# Patient Record
Sex: FEMALE | Race: WHITE | URBAN - METROPOLITAN AREA
[De-identification: names, ages, dates, MRNs, and addresses within clinical notes are randomized per-mention and may not be internally consistent; named-entity substitution may affect disease eponyms.]

---

## 2017-12-24 ENCOUNTER — OFFICE VISIT (OUTPATIENT)
Dept: URGENT CARE | Facility: URGENT CARE | Age: 82
End: 2017-12-24
Payer: COMMERCIAL

## 2017-12-24 VITALS
DIASTOLIC BLOOD PRESSURE: 77 MMHG | WEIGHT: 167.7 LBS | TEMPERATURE: 97.3 F | OXYGEN SATURATION: 96 % | SYSTOLIC BLOOD PRESSURE: 136 MMHG | HEART RATE: 66 BPM

## 2017-12-24 DIAGNOSIS — N39.0 ACUTE UTI: Primary | ICD-10-CM

## 2017-12-24 DIAGNOSIS — R30.0 DYSURIA: ICD-10-CM

## 2017-12-24 DIAGNOSIS — R82.90 NONSPECIFIC FINDING ON EXAMINATION OF URINE: ICD-10-CM

## 2017-12-24 LAB
BACTERIA #/AREA URNS HPF: ABNORMAL /HPF
RBC #/AREA URNS AUTO: ABNORMAL /HPF
WBC #/AREA URNS AUTO: ABNORMAL /HPF

## 2017-12-24 PROCEDURE — 99203 OFFICE O/P NEW LOW 30 MIN: CPT | Performed by: PHYSICIAN ASSISTANT

## 2017-12-24 PROCEDURE — 81015 MICROSCOPIC EXAM OF URINE: CPT | Performed by: PHYSICIAN ASSISTANT

## 2017-12-24 PROCEDURE — 87086 URINE CULTURE/COLONY COUNT: CPT | Performed by: PHYSICIAN ASSISTANT

## 2017-12-24 RX ORDER — CEFDINIR 300 MG/1
300 CAPSULE ORAL 2 TIMES DAILY
Qty: 20 CAPSULE | Refills: 0 | Status: SHIPPED | OUTPATIENT
Start: 2017-12-24

## 2017-12-24 RX ORDER — DILTIAZEM HYDROCHLORIDE 120 MG/1
CAPSULE, EXTENDED RELEASE ORAL DAILY
COMMUNITY

## 2017-12-24 RX ORDER — NITROGLYCERIN 40 MG/1
1 PATCH TRANSDERMAL DAILY
COMMUNITY

## 2017-12-24 NOTE — PROGRESS NOTES
SUBJECTIVE:   Gloria LEONE Haaseth is a 84 year old female who  presents today for a possible UTI. Symptoms of dysuria and frequency have been going on for 2-3 days with low back discomfort.     She was treated with Septra about 10 days ago for a UTI, but her symptoms never completely cleared.    Hematuria no.  gradual onset and moderate.  There is no history of fever, chills, nausea or vomiting.  No history of vaginal discharge. This patient does have a history of urinary tract infections. Patient denies long duration, rigors, flank pain, temperature > 101 degrees F. and Vomiting, significant nausea or diarrhea.     SH: patient is visiting a friend in [facility], here from [place].       No past medical history on file.   UTI's  Atrial fibrillation  HTN    There is no problem list on file for this patient.    Social History   Substance Use Topics     Smoking status: Never Smoker     Smokeless tobacco: Not on file     Alcohol use Not on file       ROS:   CONSTITUTIONAL:NEGATIVE for fever, chills, change in weight  INTEGUMENTARY/SKIN: NEGATIVE for worrisome rashes, moles or lesions  EYES: NEGATIVE for vision changes or irritation  ENT/MOUTH: NEGATIVE for ear, mouth and throat problems  RESP:NEGATIVE for significant cough or SOB  CV: NEGATIVE for chest pain, palpitations or peripheral edema  GI: NEGATIVE for nausea, abdominal pain, heartburn, or change in bowel habits  : as per HPI  MUSCULOSKELETAL: as per HPI  NEURO: NEGATIVE for weakness, dizziness or paresthesias    OBJECTIVE:  /77  Pulse 66  Temp 97.3  F (36.3  C) (Oral)  Wt 167 lb 11.2 oz (76.1 kg)  SpO2 96%  GENERAL APPEARANCE: healthy, alert and no distress  ABDOMEN:  soft, nontender, no HSM or masses and bowel sounds normal  BACK: No CVA tenderness  SKIN: no suspicious lesions or rashes    ASSESSMENT:   Lower, uncomplicated urinary tract infection.    PLAN:  As per ordered above  Drink plenty of fluids.  Prevention and treatment of UTI's discussed.Signs  and symptoms of pyelonephritis mentioned.  Follow up with primary care physician if not improving

## 2017-12-24 NOTE — LETTER
[facility]    [address] [address]  Phone: [phone]      Gloria LEONE Haaseth  [place] [address]    12/26/2017        Dear Casie    I am writing you concerning your recent lab results obtained at the clinic. Your tests have returned and are listed below. If you have any questions or would like to discuss this further with me please feel free to make an appointment at anytime  Looking forward to seeing you in the future.    Message  Received: Today       Kayla Conroy MD  [facility] Nurse                   Urine Culture was essentially negative.  Patient was started on Omnicef 12/24/2017.  Patient should be reevaluated if her symptoms have not improved.      Results for orders placed or performed in visit on 12/24/17   Urine Microscopic   Result Value Ref Range    WBC Urine  (A) OTO2^O - 2 /HPF    RBC Urine O - 2 OTO2^O - 2 /HPF    Bacteria Urine Few (A) NEG^Negative /HPF   Urine Culture Aerobic Bacterial   Result Value Ref Range    Specimen Description Midstream Urine     Culture Micro       50,000 to 100,000 colonies/mL  mixed urogenital rip  Susceptibility testing not routinely done               Sincerely,  Dr Conroy          [facility]  [address] [address] [phone]

## 2017-12-24 NOTE — PATIENT INSTRUCTIONS
(N39.0) Acute UTI  (primary encounter diagnosis)  Comment:   Plan: cefdinir (OMNICEF) 300 MG capsule            (R30.0) Dysuria  Comment:   Plan: Urine Microscopic, CANCELED: UA with         Microscopic reflex to Culture            (R82.90) Nonspecific finding on examination of urine  Comment:   Plan: Urine Culture Aerobic Bacterial          Follow up should symptoms persist or worsen

## 2017-12-24 NOTE — NURSING NOTE
Chief Complaint   Patient presents with     Urinary Problem     Pt was seen and given antibiotic for uti x about 1.5 weeks. Pt complains of burning sensation and back pain x about 2-3 days        Initial /77  Pulse 66  Temp 97.3  F (36.3  C) (Oral)  Wt 167 lb 11.2 oz (76.1 kg)  SpO2 96% There is no height or weight on file to calculate BMI.  Medication Reconciliation: complete

## 2017-12-24 NOTE — MR AVS SNAPSHOT
"              After Visit Summary   12/24/2017    Gloria LEONE Haaseth    MRN: [id number]           Patient Information     Date Of Birth          8/18/1933        Visit Information        Provider Department      12/24/2017 12:00 PM Skye Michel PA-C [facility]        Today's Diagnoses     Acute UTI    -  1    Dysuria        Nonspecific finding on examination of urine          Care Instructions    (N39.0) Acute UTI  (primary encounter diagnosis)  Comment:   Plan: cefdinir (OMNICEF) 300 MG capsule            (R30.0) Dysuria  Comment:   Plan: Urine Microscopic, CANCELED: UA with         Microscopic reflex to Culture            (R82.90) Nonspecific finding on examination of urine  Comment:   Plan: Urine Culture Aerobic Bacterial          Follow up should symptoms persist or worsen            Follow-ups after your visit        Who to contact     If you have questions or need follow up information about today's clinic visit or your schedule please contact [facility] directly at [phone].  Normal or non-critical lab and imaging results will be communicated to you by [employer], letter or phone within 4 business days after the clinic has received the results. If you do not hear from us within 7 days, please contact the clinic through [employer] or phone. If you have a critical or abnormal lab result, we will notify you by phone as soon as possible.  Submit refill requests through [employer] or call your pharmacy and they will forward the refill request to us. Please allow 3 business days for your refill to be completed.          Additional Information About Your Visit        [employer] Information     [employer] lets you send messages to your doctor, view your test results, renew your prescriptions, schedule appointments and more. To sign up, go to [email or web address] . Click on \"Log in\" on the left side of the screen, which will take you to the Welcome " "page. Then click on \"Sign up Now\" on the right side of the page.     You will be asked to enter the access code listed below, as well as some personal information. Please follow the directions to create your username and password.     Your access code is: [email or web address]  Expires: 3/24/2018  2:46 PM     Your access code will  in 90 days. If you need help or a new code, please call your [facility] clinic or [phone].        Care EveryWhere ID     This is your Care EveryWhere ID. This could be used by other organizations to access your [facility] medical records  [id number]        Your Vitals Were     Pulse Temperature Pulse Oximetry             66 97.3  F (36.3  C) (Oral) 96%          Blood Pressure from Last 3 Encounters:   17 136/77    Weight from Last 3 Encounters:   17 167 lb 11.2 oz (76.1 kg)              We Performed the Following     Urine Culture Aerobic Bacterial     Urine Microscopic          Today's Medication Changes          These changes are accurate as of: 17  2:46 PM.  If you have any questions, ask your nurse or doctor.               Start taking these medicines.        Dose/Directions    cefdinir 300 MG capsule   Commonly known as:  OMNICEF   Used for:  Acute UTI   Started by:  Skye Michel PA-C        Dose:  300 mg   Take 1 capsule (300 mg) by mouth 2 times daily   Quantity:  20 capsule   Refills:  0            Where to get your medicines      These medications were sent to [facility] [address] [address]     Phone:  [phone]     cefdinir 300 MG capsule                Primary Care Provider Fax #    Physician No Ref-Primary [phone]       No address on file        Equal Access to Services     RHODA WELLS : Riley Mccann, wavidhi quiroz, qaybta kaalclarence harman, anmol palafox. So [facility] [phone].    ATENCIÓN: Si john espabdirahman vigil a owens " disposición servicios gratuitos de asistencia lingüística. Shagufta duque [phone].    We comply with applicable federal civil rights laws and [place] laws. We do not discriminate on the basis of race, color, national origin, age, disability, sex, sexual orientation, or gender identity.            Thank you!     Thank you for choosing [facility] [place] [facility]  for your care. Our goal is always to provide you with excellent care. Hearing back from our patients is one way we can continue to improve our services. Please take a few minutes to complete the written survey that you may receive in the mail after your visit with us. Thank you!             Your Updated Medication List - Protect others around you: Learn how to safely use, store and throw away your medicines at www.disposemymeds.org.          This list is accurate as of: 12/24/17  2:46 PM.  Always use your most recent med list.                   Brand Name Dispense Instructions for use Diagnosis    ACETAMINOPHEN PO      Take 325 mg by mouth        ATENOLOL PO      Take 25 mg by mouth        BIOFLAVONOIDS PO      Take 1,000 mg by mouth        calcium-vitamin D 600-400 MG-UNIT per tablet    CALTRATE     Take 1 tablet by mouth 2 times daily        cefdinir 300 MG capsule    OMNICEF    20 capsule    Take 1 capsule (300 mg) by mouth 2 times daily    Acute UTI       CENTRUM CARDIO PO           CRANBERRY-VITAMIN C PO           diltiazem 120 MG 24 hr ER beaded capsule    TIAZAC     Take by mouth daily        ELAVIL PO      Take 25 mg by mouth At Bedtime        FISH OIL OMEGA-3 PO           LEVOTHYROXINE SODIUM PO      Take 75 mcg by mouth        LORAZEPAM PO      Take 1 mg by mouth        MECLIZINE HCL PO      Take 25 mg by mouth every 4 hours        nitroGLYcerin 0.2 MG/HR 24 hr patch    NITRODUR     Place 1 patch onto the skin daily        OMEPRAZOLE PO           SIMVASTATIN PO      Take 40 mg by mouth At Bedtime        TRAMADOL HCL PO      Take 50 mg  by mouth        WARFARIN SODIUM PO      Take 1 mg by mouth daily

## 2017-12-25 LAB
BACTERIA SPEC CULT: NORMAL
SPECIMEN SOURCE: NORMAL